# Patient Record
Sex: FEMALE | Race: WHITE | NOT HISPANIC OR LATINO
[De-identification: names, ages, dates, MRNs, and addresses within clinical notes are randomized per-mention and may not be internally consistent; named-entity substitution may affect disease eponyms.]

---

## 2017-04-28 PROBLEM — Z00.00 ENCOUNTER FOR PREVENTIVE HEALTH EXAMINATION: Status: ACTIVE | Noted: 2017-04-28

## 2017-06-29 ENCOUNTER — APPOINTMENT (OUTPATIENT)
Dept: BREAST CENTER | Facility: CLINIC | Age: 52
End: 2017-06-29

## 2017-06-29 VITALS — BODY MASS INDEX: 27.49 KG/M2 | WEIGHT: 165 LBS | HEIGHT: 65 IN

## 2017-06-29 DIAGNOSIS — Z87.891 PERSONAL HISTORY OF NICOTINE DEPENDENCE: ICD-10-CM

## 2017-06-29 DIAGNOSIS — Z91.09 OTHER ALLERGY STATUS, OTHER THAN TO DRUGS AND BIOLOGICAL SUBSTANCES: ICD-10-CM

## 2017-06-29 RX ORDER — CETIRIZINE HYDROCHLORIDE 10 MG/1
10 CAPSULE, LIQUID FILLED ORAL
Refills: 0 | Status: ACTIVE | COMMUNITY

## 2017-06-29 RX ORDER — ALPRAZOLAM 0.5 MG/1
0.5 TABLET ORAL
Qty: 2 | Refills: 0 | Status: DISCONTINUED | COMMUNITY
Start: 2017-05-02 | End: 2017-06-29

## 2017-06-29 RX ORDER — MONTELUKAST SODIUM 10 MG/1
TABLET, FILM COATED ORAL
Refills: 0 | Status: ACTIVE | COMMUNITY

## 2018-01-04 ENCOUNTER — APPOINTMENT (OUTPATIENT)
Dept: BREAST CENTER | Facility: CLINIC | Age: 53
End: 2018-01-04

## 2018-01-11 ENCOUNTER — APPOINTMENT (OUTPATIENT)
Dept: BREAST CENTER | Facility: CLINIC | Age: 53
End: 2018-01-11

## 2018-01-18 ENCOUNTER — APPOINTMENT (OUTPATIENT)
Dept: BREAST CENTER | Facility: CLINIC | Age: 53
End: 2018-01-18

## 2018-06-07 ENCOUNTER — APPOINTMENT (OUTPATIENT)
Dept: BREAST CENTER | Facility: CLINIC | Age: 53
End: 2018-06-07
Payer: COMMERCIAL

## 2018-06-07 VITALS
OXYGEN SATURATION: 94 % | HEIGHT: 65 IN | HEART RATE: 73 BPM | BODY MASS INDEX: 27.49 KG/M2 | DIASTOLIC BLOOD PRESSURE: 64 MMHG | SYSTOLIC BLOOD PRESSURE: 100 MMHG | WEIGHT: 165 LBS

## 2018-06-07 DIAGNOSIS — Z78.9 OTHER SPECIFIED HEALTH STATUS: ICD-10-CM

## 2018-06-07 DIAGNOSIS — Z80.3 FAMILY HISTORY OF MALIGNANT NEOPLASM OF BREAST: ICD-10-CM

## 2018-06-07 DIAGNOSIS — N63.10 UNSPECIFIED LUMP IN THE RIGHT BREAST, UNSPECIFIED QUADRANT: ICD-10-CM

## 2018-06-07 PROCEDURE — 99212 OFFICE O/P EST SF 10 MIN: CPT

## 2018-12-04 ENCOUNTER — APPOINTMENT (OUTPATIENT)
Dept: BREAST CENTER | Facility: CLINIC | Age: 53
End: 2018-12-04

## 2019-01-29 ENCOUNTER — APPOINTMENT (OUTPATIENT)
Dept: BREAST CENTER | Facility: CLINIC | Age: 54
End: 2019-01-29

## 2019-02-07 ENCOUNTER — TRANSCRIPTION ENCOUNTER (OUTPATIENT)
Age: 54
End: 2019-02-07

## 2019-02-15 ENCOUNTER — TRANSCRIPTION ENCOUNTER (OUTPATIENT)
Age: 54
End: 2019-02-15

## 2019-04-18 ENCOUNTER — APPOINTMENT (OUTPATIENT)
Dept: BREAST CENTER | Facility: CLINIC | Age: 54
End: 2019-04-18
Payer: COMMERCIAL

## 2019-04-18 VITALS
TEMPERATURE: 97.6 F | HEIGHT: 65 IN | BODY MASS INDEX: 27.49 KG/M2 | SYSTOLIC BLOOD PRESSURE: 118 MMHG | DIASTOLIC BLOOD PRESSURE: 74 MMHG | WEIGHT: 165 LBS

## 2019-04-18 PROCEDURE — 99212 OFFICE O/P EST SF 10 MIN: CPT

## 2019-11-19 ENCOUNTER — APPOINTMENT (OUTPATIENT)
Dept: BREAST CENTER | Facility: CLINIC | Age: 54
End: 2019-11-19

## 2020-08-05 ENCOUNTER — APPOINTMENT (OUTPATIENT)
Dept: BREAST CENTER | Facility: CLINIC | Age: 55
End: 2020-08-05
Payer: COMMERCIAL

## 2020-08-05 VITALS
BODY MASS INDEX: 27.49 KG/M2 | HEIGHT: 65 IN | SYSTOLIC BLOOD PRESSURE: 112 MMHG | DIASTOLIC BLOOD PRESSURE: 75 MMHG | WEIGHT: 165 LBS | TEMPERATURE: 97.5 F

## 2020-08-05 PROCEDURE — 99213 OFFICE O/P EST LOW 20 MIN: CPT

## 2020-08-05 NOTE — PAST MEDICAL HISTORY
[Menarche Age ____] : age at menarche was [unfilled] [Live Births ___] : P[unfilled]  [Total Preg ___] : G[unfilled] [Age At Live Birth ___] : Age at live birth: [unfilled] [FreeTextEntry6] : none [FreeTextEntry5] : none [FreeTextEntry8] :  first child for 2 years, second for three years, third for 17 months, and fourth for 2 years. [FreeTextEntry7] : OCP use

## 2020-08-05 NOTE — PHYSICAL EXAM
[Atraumatic] : atraumatic [Normocephalic] : normocephalic [No Supraclavicular Adenopathy] : no supraclavicular adenopathy [No Cervical Adenopathy] : no cervical adenopathy [No dominant masses] : no dominant masses in right breast  [No dominant masses] : no dominant masses left breast [No Nipple Discharge] : no left nipple discharge [No Axillary Lymphadenopathy] : no left axillary lymphadenopathy [No Ulceration] : no ulceration [No Rashes] : no rashes [Breast Nipple Inversion] : nipples not inverted [Breast Nipple Retraction] : nipples not retracted

## 2020-08-05 NOTE — DATA REVIEWED
[FreeTextEntry1] : B/L Screening Mammo - 07/22/2020: \par \par Tomosynthesis and 2D imaging of the breast(s) were performed. Current study was also evaluated with a computer aided detection (CAD) system.\par \par Breast density: Almost entirely fatty.\par \par No significant masses, calcifications, or other findings are seen in either breast.\par \par There are biopsy clips in both breasts\par \par IMPRESSION:\par \par There is no mammographic evidence of malignancy.\par \par A 1 year screening mammogram of both breast(s) is recommended. The patient will be sent a normal letter.\par \par BI-RADS 1: NEGATIVE

## 2020-08-05 NOTE — HISTORY OF PRESENT ILLNESS
[FreeTextEntry1] : Patient with left breast benign breast tissue with focal cystic changes and calcifications on Stereo 3/7/13. \par \par No prior complaints related to the breasts. \par Family history of breast cancer - mother 68, maternal aunt x2 30's, paternal grandmother 80's.\par \par Her Geeta Model risk assessment is:\par 2.5 % in five years \par 21.6 % in her lifetime. \par \par Right large cyst and FC changes on ultrasound guided core biopsy 5/10/17; 8-9:00 retroareolar, 8 mm (hourglass).\par \par SUZY RICHARD is a 54 year old female patient who presents today in follow up for high risk screening.\par Since her last visit, she has no new breast related complaints. \par Most recent imaging was done on 07/22/2020, which revealed no mammographic evidence of malignancy.\par \par She presents today for evaluation and imaging review.

## 2020-08-05 NOTE — REVIEW OF SYSTEMS
[As Noted in HPI] : as noted in HPI [Negative] : Constitutional [Breast Pain] : no breast pain [Breast Lump] : no breast lump [Nipple Discharge] : no nipple discharge [Nipple Inverted] : no inversion of the nipple

## 2021-08-03 ENCOUNTER — NON-APPOINTMENT (OUTPATIENT)
Age: 56
End: 2021-08-03

## 2021-08-10 ENCOUNTER — APPOINTMENT (OUTPATIENT)
Dept: BREAST CENTER | Facility: CLINIC | Age: 56
End: 2021-08-10
Payer: COMMERCIAL

## 2021-08-10 VITALS
TEMPERATURE: 97.2 F | SYSTOLIC BLOOD PRESSURE: 116 MMHG | HEIGHT: 65 IN | BODY MASS INDEX: 28.32 KG/M2 | WEIGHT: 170 LBS | DIASTOLIC BLOOD PRESSURE: 72 MMHG

## 2021-08-10 PROCEDURE — 99212 OFFICE O/P EST SF 10 MIN: CPT

## 2021-08-11 NOTE — ASSESSMENT
[FreeTextEntry1] : SUZY is a zoey 55 year old patient who presented today in follow up for high risk screening.  \par She has been doing well with no new breast related complaints. \par Imaging was done recently which revealed no mammographic evidence of malignancy, as detailed above. \par Physical exam was unrevealing today.\par \par PLAN: Imaging with a bilateral breast MRI will be due in January 2022, and that will be scheduled today.  \par She will return for follow-up and clinical breast exam in six months.\par She will be due for b/l mammo in August 2022\par \par I spent a total of 15 minutes of face to face time with this patient, greater than 50% of which was spent in counseling and/or coordination of care.\par All of her questions were appropriately answered.\par She knows to call with any concerns.

## 2021-08-11 NOTE — DATA REVIEWED
[FreeTextEntry1] : Mammogram on 08/02/21:\par Breast density- almost entirely fatty.\par No significant masses, calcifications or other finding are seen in either breast\par There are biopsy clip noted anteriorly in the both breasts\par BIRADS 1 Negative .

## 2021-08-11 NOTE — PAST MEDICAL HISTORY
[Menarche Age ____] : age at menarche was [unfilled] [Total Preg ___] : G[unfilled] [Live Births ___] : P[unfilled]  [Age At Live Birth ___] : Age at live birth: [unfilled] [FreeTextEntry5] : none [FreeTextEntry6] : none [FreeTextEntry7] : OCP use [FreeTextEntry8] :  first child for 2 years, second for three years, third for 17 months, and fourth for 2 years.

## 2021-08-11 NOTE — HISTORY OF PRESENT ILLNESS
[FreeTextEntry1] : Patient with left breast benign breast tissue with focal cystic changes and calcifications on Stereo 3/7/13. \par \par No prior complaints related to the breasts. \par Family history of breast cancer - mother 68, maternal aunt x2 30's, paternal grandmother 80's.\par \par Her Geeta Model risk assessment is:\par 2.5 % in five years \par 21.6 % in her lifetime. \par \par Right large cyst and FC changes on ultrasound guided core biopsy 5/10/17; 8-9:00 retroareolar, 8 mm (hourglass).\par \par SUZY RICHARD is a 55 year old female patient who presents today in follow up for high risk screening.\par Since her last visit, she has no new breast related complaints. \par Most recent imaging was done on 08/02/2021, which revealed no mammographic evidence of malignancy.\par \par She presents today for evaluation and imaging review.

## 2022-02-09 ENCOUNTER — APPOINTMENT (OUTPATIENT)
Dept: BREAST CENTER | Facility: CLINIC | Age: 57
End: 2022-02-09
Payer: COMMERCIAL

## 2022-02-09 VITALS
TEMPERATURE: 98.3 F | WEIGHT: 170 LBS | SYSTOLIC BLOOD PRESSURE: 129 MMHG | HEIGHT: 65 IN | DIASTOLIC BLOOD PRESSURE: 78 MMHG | BODY MASS INDEX: 28.32 KG/M2

## 2022-02-09 DIAGNOSIS — N60.11 DIFFUSE CYSTIC MASTOPATHY OF LEFT BREAST: ICD-10-CM

## 2022-02-09 DIAGNOSIS — Z12.39 ENCOUNTER FOR OTHER SCREENING FOR MALIGNANT NEOPLASM OF BREAST: ICD-10-CM

## 2022-02-09 DIAGNOSIS — N60.12 DIFFUSE CYSTIC MASTOPATHY OF LEFT BREAST: ICD-10-CM

## 2022-02-09 DIAGNOSIS — Z87.2 PERSONAL HISTORY OF DISEASES OF THE SKIN AND SUBCUTANEOUS TISSUE: ICD-10-CM

## 2022-02-09 PROCEDURE — 99212 OFFICE O/P EST SF 10 MIN: CPT

## 2022-02-09 NOTE — DATA REVIEWED
[FreeTextEntry1] : B/L Breast MRI - 02/01/2022: \par \par Amount of fibroglandular tissue: Almost entirely fat.\par  \par Background parenchymal enhancement: Minimal symmetric.\par  \par Findings: Vitamin E tablet has been placed over the skin surface of the lateral right breast at approximately the 8-9 o'clock location. There are susceptibility artifacts in both breasts due to biopsy clips\par  \par RIGHT:\par No suspicious enhancing masses or areas of ductal enhancement are seen in the right breast.\par  \par  \par LEFT:\par No suspicious enhancing masses or areas of ductal enhancement are seen in the left breast.\par  \par  \par AXILLA and ANCILLARY FINDINGS:\par There is no abnormal axillary or internal mammary adenopathy.\par  \par IMPRESSION: \par  \par No MRI evidence to suggest malignancy in either breast\par  \par An MRI exam of both breast is recommended in one year.\par The patient should return for her annual bilateral mammogram in August 2022\par  \par BI-RADS 1: NEGATIVE

## 2022-02-09 NOTE — HISTORY OF PRESENT ILLNESS
[FreeTextEntry1] : Patient with left breast benign breast tissue with focal cystic changes and calcifications on Stereo 3/7/13. \par \par No prior complaints related to the breasts. \par Family history of breast cancer - mother 68, maternal aunt x2 30's, paternal grandmother 80's.\par \par Her Geeta Model risk assessment is:\par 2.5 % in five years \par 21.6 % in her lifetime. \par \par Right large cyst and FC changes on ultrasound guided core biopsy 5/10/17; 8-9:00 retroareolar, 8 mm (hourglass).\par \par \par SUZY RICHARD is a 56 year old female patient who presents today in follow up for high risk screening.\par Over the past 3-4 weeks, she has noticed an area of dimpling in the lateral side of her right breast.\par She denies any palpable lumps, nipple discharge or inversion and / or other skin changes.\par Most recent imaging was a bilateral breast MRI done on 02/01/2022, which revealed no MRI evidence to suggest malignancy in either breast\par  BI-RADS 1: NEGATIVE\par \par She presents today for evaluation and imaging review.

## 2022-02-09 NOTE — REVIEW OF SYSTEMS
[As Noted in HPI] : as noted in HPI [Dimpling Of Skin] : dimpling of breast skin [Negative] : Constitutional [Breast Pain] : no breast pain [Breast Lump] : no breast lump [Nipple Discharge] : no nipple discharge [Nipple Inverted] : no inversion of the nipple

## 2022-02-09 NOTE — PHYSICAL EXAM
[Normocephalic] : normocephalic [Atraumatic] : atraumatic [No Supraclavicular Adenopathy] : no supraclavicular adenopathy [No dominant masses] : no dominant masses in right breast  [No dominant masses] : no dominant masses left breast [No Nipple Discharge] : no left nipple discharge [No Rashes] : no rashes [No Ulceration] : no ulceration [Breast Nipple Inversion] : nipples not inverted [Breast Nipple Retraction] : nipples not retracted [de-identified] : area of slight skin dimpling noted on the lateral aspect of the right breast. [de-identified] : No axillary lymphadenopathy appreciated. [de-identified] : No axillary lymphadenopathy appreciated.

## 2022-02-09 NOTE — ASSESSMENT
[FreeTextEntry1] : SUZY is a zoey 56 year old patient who presented today in follow up for high risk screening.  \par Over the past 3-4 weeks, she has noticed an area of dimpling in the lateral side of her right breast.\par She denies any palpable lumps, nipple discharge or inversion and / or other skin changes.\par Most recent imaging was a bilateral breast MRI done on 02/01/2022, which revealed no MRI evidence to suggest malignancy in either breast\par  BI-RADS 1: NEGATIVE, as detailed above. \par \par Physical exam today shows area of slight skin dimpling noted on the lateral aspect of the right breast.\par \par Although recent MRI imaging was negative, due to new onset finding of right breast skin dimpling - I will order a right breast diagnostic mammogram and sonogram for further evaluation.\par We will discuss these results when they become available.\par If benign, she will have a bilateral screening mammogram in August 2022\par She will return for follow-up and clinical breast exam in six months.\par \par The patient was informed that Dr. José Milner will no longer be practicing here as of the end of August 2021; her care will be continued with the practice.\par \par I spent a total of 15 minutes of face to face time with this patient, greater than 50% of which was spent in counseling and/or coordination of care.\par All of her questions were appropriately answered.\par She knows to call with any concerns.

## 2023-07-26 NOTE — HISTORY OF PRESENT ILLNESS
[FreeTextEntry1] : Patient is a 57 year old female here today for a follow up visit. \par 2/16/2023 Right mammogram: No significant masses, calcifications, or other findings are seen in the right breast. Biopsy clip is again noted in the anterior 8-9 o'clock right breast. \par Right ultrasound: No suspicious abnormalities were seen sonographically in the right breast. \par The lateral area of concern at approximately 8:00, 9 cm from the nipple was unremarkable. Scanning of this area was performed by the technologist as well as the dictating radiologist. There Is a 0.3 cm subtle hypoechoic lesion with a biopsy clip in the 8-9 o'clock retroareolar right breast, biopsy-proven benign in May 2017.

## 2023-07-31 ENCOUNTER — APPOINTMENT (OUTPATIENT)
Dept: PLASTIC SURGERY | Facility: CLINIC | Age: 58
End: 2023-07-31

## 2023-09-08 ENCOUNTER — NON-APPOINTMENT (OUTPATIENT)
Age: 58
End: 2023-09-08

## 2023-09-08 DIAGNOSIS — M25.529 PAIN IN UNSPECIFIED ELBOW: ICD-10-CM

## 2023-09-08 DIAGNOSIS — Z82.0 FAMILY HISTORY OF EPILEPSY AND OTHER DISEASES OF THE NERVOUS SYSTEM: ICD-10-CM

## 2023-09-08 DIAGNOSIS — Z78.9 OTHER SPECIFIED HEALTH STATUS: ICD-10-CM

## 2023-09-08 DIAGNOSIS — G43.909 MIGRAINE, UNSPECIFIED, NOT INTRACTABLE, W/OUT STATUS MIGRAINOSUS: ICD-10-CM

## 2024-02-25 ENCOUNTER — RESULT REVIEW (OUTPATIENT)
Age: 59
End: 2024-02-25

## 2024-02-25 ENCOUNTER — EMERGENCY (EMERGENCY)
Facility: HOSPITAL | Age: 59
LOS: 0 days | Discharge: HOME | End: 2024-02-25
Attending: EMERGENCY MEDICINE
Payer: COMMERCIAL

## 2024-02-25 ENCOUNTER — TRANSCRIPTION ENCOUNTER (OUTPATIENT)
Age: 59
End: 2024-02-25

## 2024-02-25 VITALS
HEART RATE: 72 BPM | OXYGEN SATURATION: 98 % | DIASTOLIC BLOOD PRESSURE: 60 MMHG | TEMPERATURE: 98 F | RESPIRATION RATE: 18 BRPM | SYSTOLIC BLOOD PRESSURE: 119 MMHG

## 2024-02-25 VITALS
WEIGHT: 169.98 LBS | SYSTOLIC BLOOD PRESSURE: 130 MMHG | HEART RATE: 105 BPM | DIASTOLIC BLOOD PRESSURE: 80 MMHG | OXYGEN SATURATION: 99 % | TEMPERATURE: 98 F | RESPIRATION RATE: 18 BRPM

## 2024-02-25 DIAGNOSIS — S09.90XA UNSPECIFIED INJURY OF HEAD, INITIAL ENCOUNTER: ICD-10-CM
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
## 2024-02-25 DIAGNOSIS — R12 HEARTBURN: ICD-10-CM

## 2024-02-25 DIAGNOSIS — R07.89 OTHER CHEST PAIN: ICD-10-CM

## 2024-02-25 DIAGNOSIS — M54.9 DORSALGIA, UNSPECIFIED: ICD-10-CM

## 2024-02-25 DIAGNOSIS — Z78.9 OTHER SPECIFIED HEALTH STATUS: Chronic | ICD-10-CM

## 2024-02-25 DIAGNOSIS — R55 SYNCOPE AND COLLAPSE: ICD-10-CM

## 2024-02-25 DIAGNOSIS — Z87.891 PERSONAL HISTORY OF NICOTINE DEPENDENCE: ICD-10-CM
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
## 2024-02-25 LAB
ALBUMIN SERPL ELPH-MCNC: 4.9 G/DL — SIGNIFICANT CHANGE UP (ref 3.5–5.2)
ALP SERPL-CCNC: 103 U/L — SIGNIFICANT CHANGE UP (ref 30–115)
ALT FLD-CCNC: 19 U/L — SIGNIFICANT CHANGE UP (ref 0–41)
ANION GAP SERPL CALC-SCNC: 15 MMOL/L — HIGH (ref 7–14)
AST SERPL-CCNC: 22 U/L — SIGNIFICANT CHANGE UP (ref 0–41)
BASOPHILS # BLD AUTO: 0.1 K/UL — SIGNIFICANT CHANGE UP (ref 0–0.2)
BASOPHILS NFR BLD AUTO: 1.2 % — HIGH (ref 0–1)
BILIRUB SERPL-MCNC: <0.2 MG/DL — SIGNIFICANT CHANGE UP (ref 0.2–1.2)
BUN SERPL-MCNC: 19 MG/DL — SIGNIFICANT CHANGE UP (ref 10–20)
CALCIUM SERPL-MCNC: 9.4 MG/DL — SIGNIFICANT CHANGE UP (ref 8.4–10.5)
CHLORIDE SERPL-SCNC: 105 MMOL/L — SIGNIFICANT CHANGE UP (ref 98–110)
CO2 SERPL-SCNC: 20 MMOL/L — SIGNIFICANT CHANGE UP (ref 17–32)
CREAT SERPL-MCNC: 0.7 MG/DL — SIGNIFICANT CHANGE UP (ref 0.7–1.5)
EGFR: 101 ML/MIN/1.73M2 — SIGNIFICANT CHANGE UP
EOSINOPHIL # BLD AUTO: 0.3 K/UL — SIGNIFICANT CHANGE UP (ref 0–0.7)
EOSINOPHIL NFR BLD AUTO: 3.6 % — SIGNIFICANT CHANGE UP (ref 0–8)
GLUCOSE SERPL-MCNC: 158 MG/DL — HIGH (ref 70–99)
HCT VFR BLD CALC: 42.4 % — SIGNIFICANT CHANGE UP (ref 37–47)
HGB BLD-MCNC: 13.9 G/DL — SIGNIFICANT CHANGE UP (ref 12–16)
IMM GRANULOCYTES NFR BLD AUTO: 0.4 % — HIGH (ref 0.1–0.3)
LACTATE SERPL-SCNC: 2.7 MMOL/L — HIGH (ref 0.7–2)
LIDOCAIN IGE QN: 44 U/L — SIGNIFICANT CHANGE UP (ref 7–60)
LYMPHOCYTES # BLD AUTO: 1.55 K/UL — SIGNIFICANT CHANGE UP (ref 1.2–3.4)
LYMPHOCYTES # BLD AUTO: 18.6 % — LOW (ref 20.5–51.1)
MCHC RBC-ENTMCNC: 27.7 PG — SIGNIFICANT CHANGE UP (ref 27–31)
MCHC RBC-ENTMCNC: 32.8 G/DL — SIGNIFICANT CHANGE UP (ref 32–37)
MCV RBC AUTO: 84.5 FL — SIGNIFICANT CHANGE UP (ref 81–99)
MONOCYTES # BLD AUTO: 0.52 K/UL — SIGNIFICANT CHANGE UP (ref 0.1–0.6)
MONOCYTES NFR BLD AUTO: 6.2 % — SIGNIFICANT CHANGE UP (ref 1.7–9.3)
NEUTROPHILS # BLD AUTO: 5.85 K/UL — SIGNIFICANT CHANGE UP (ref 1.4–6.5)
NEUTROPHILS NFR BLD AUTO: 70 % — SIGNIFICANT CHANGE UP (ref 42.2–75.2)
NRBC # BLD: 0 /100 WBCS — SIGNIFICANT CHANGE UP (ref 0–0)
PLATELET # BLD AUTO: 299 K/UL — SIGNIFICANT CHANGE UP (ref 130–400)
PMV BLD: 9.1 FL — SIGNIFICANT CHANGE UP (ref 7.4–10.4)
POTASSIUM SERPL-MCNC: 3.9 MMOL/L — SIGNIFICANT CHANGE UP (ref 3.5–5)
POTASSIUM SERPL-SCNC: 3.9 MMOL/L — SIGNIFICANT CHANGE UP (ref 3.5–5)
PROT SERPL-MCNC: 7.7 G/DL — SIGNIFICANT CHANGE UP (ref 6–8)
RBC # BLD: 5.02 M/UL — SIGNIFICANT CHANGE UP (ref 4.2–5.4)
RBC # FLD: 13.2 % — SIGNIFICANT CHANGE UP (ref 11.5–14.5)
SODIUM SERPL-SCNC: 140 MMOL/L — SIGNIFICANT CHANGE UP (ref 135–146)
TROPONIN T, HIGH SENSITIVITY RESULT: 8 NG/L — SIGNIFICANT CHANGE UP (ref 6–13)
TROPONIN T, HIGH SENSITIVITY RESULT: 9 NG/L — SIGNIFICANT CHANGE UP (ref 6–13)
WBC # BLD: 8.35 K/UL — SIGNIFICANT CHANGE UP (ref 4.8–10.8)
WBC # FLD AUTO: 8.35 K/UL — SIGNIFICANT CHANGE UP (ref 4.8–10.8)

## 2024-02-25 PROCEDURE — 70450 CT HEAD/BRAIN W/O DYE: CPT | Mod: MC

## 2024-02-25 PROCEDURE — 84484 ASSAY OF TROPONIN QUANT: CPT

## 2024-02-25 PROCEDURE — 99223 1ST HOSP IP/OBS HIGH 75: CPT

## 2024-02-25 PROCEDURE — 93306 TTE W/DOPPLER COMPLETE: CPT

## 2024-02-25 PROCEDURE — 70450 CT HEAD/BRAIN W/O DYE: CPT | Mod: 26,MC

## 2024-02-25 PROCEDURE — 93306 TTE W/DOPPLER COMPLETE: CPT | Mod: 26

## 2024-02-25 PROCEDURE — 93017 CV STRESS TEST TRACING ONLY: CPT

## 2024-02-25 PROCEDURE — 82962 GLUCOSE BLOOD TEST: CPT

## 2024-02-25 PROCEDURE — 93005 ELECTROCARDIOGRAM TRACING: CPT

## 2024-02-25 PROCEDURE — 78452 HT MUSCLE IMAGE SPECT MULT: CPT | Mod: 26,MC

## 2024-02-25 PROCEDURE — 93016 CV STRESS TEST SUPVJ ONLY: CPT

## 2024-02-25 PROCEDURE — 93010 ELECTROCARDIOGRAM REPORT: CPT

## 2024-02-25 PROCEDURE — 96374 THER/PROPH/DIAG INJ IV PUSH: CPT | Mod: XU

## 2024-02-25 PROCEDURE — 72125 CT NECK SPINE W/O DYE: CPT | Mod: 26,MC

## 2024-02-25 PROCEDURE — 83690 ASSAY OF LIPASE: CPT

## 2024-02-25 PROCEDURE — 83605 ASSAY OF LACTIC ACID: CPT

## 2024-02-25 PROCEDURE — 99285 EMERGENCY DEPT VISIT HI MDM: CPT | Mod: 25

## 2024-02-25 PROCEDURE — 71046 X-RAY EXAM CHEST 2 VIEWS: CPT

## 2024-02-25 PROCEDURE — 80053 COMPREHEN METABOLIC PANEL: CPT

## 2024-02-25 PROCEDURE — 96375 TX/PRO/DX INJ NEW DRUG ADDON: CPT | Mod: XU

## 2024-02-25 PROCEDURE — 85025 COMPLETE CBC W/AUTO DIFF WBC: CPT

## 2024-02-25 PROCEDURE — 72170 X-RAY EXAM OF PELVIS: CPT | Mod: 26

## 2024-02-25 PROCEDURE — A9500: CPT

## 2024-02-25 PROCEDURE — 71046 X-RAY EXAM CHEST 2 VIEWS: CPT | Mod: 26

## 2024-02-25 PROCEDURE — 93018 CV STRESS TEST I&R ONLY: CPT

## 2024-02-25 PROCEDURE — 72125 CT NECK SPINE W/O DYE: CPT | Mod: MC

## 2024-02-25 PROCEDURE — G0378: CPT

## 2024-02-25 PROCEDURE — 72170 X-RAY EXAM OF PELVIS: CPT

## 2024-02-25 PROCEDURE — 78452 HT MUSCLE IMAGE SPECT MULT: CPT | Mod: MC

## 2024-02-25 PROCEDURE — 36415 COLL VENOUS BLD VENIPUNCTURE: CPT

## 2024-02-25 RX ORDER — REGADENOSON 0.08 MG/ML
0.4 INJECTION, SOLUTION INTRAVENOUS ONCE
Refills: 0 | Status: COMPLETED | OUTPATIENT
Start: 2024-02-25 | End: 2024-02-25

## 2024-02-25 RX ORDER — FAMOTIDINE 10 MG/ML
20 INJECTION INTRAVENOUS ONCE
Refills: 0 | Status: COMPLETED | OUTPATIENT
Start: 2024-02-25 | End: 2024-02-25

## 2024-02-25 RX ORDER — SODIUM CHLORIDE 9 MG/ML
1000 INJECTION, SOLUTION INTRAVENOUS ONCE
Refills: 0 | Status: COMPLETED | OUTPATIENT
Start: 2024-02-25 | End: 2024-02-25

## 2024-02-25 RX ADMIN — FAMOTIDINE 20 MILLIGRAM(S): 10 INJECTION INTRAVENOUS at 08:29

## 2024-02-25 RX ADMIN — Medication 30 MILLILITER(S): at 08:28

## 2024-02-25 RX ADMIN — REGADENOSON 0.4 MILLIGRAM(S): 0.08 INJECTION, SOLUTION INTRAVENOUS at 13:42

## 2024-02-25 RX ADMIN — SODIUM CHLORIDE 1000 MILLILITER(S): 9 INJECTION, SOLUTION INTRAVENOUS at 06:58

## 2024-02-25 NOTE — ED PROVIDER NOTE - ATTENDING CONTRIBUTION TO CARE
Patient presents to ED, s/p syncope and fall. No seizure like activity noted. No tongue bite, no incontinence. Patient was having hurt burn prior to syncope.   Vitals reviewed.   Lungs: CTA, no wheezing, no crackles.  Abd: +BS, NT, ND, soft,   CNS: awake, alert, o x 3, no focal neurologic deficits.  A/P: Syncope/fall,   heart burn,   labs, EKG, imaging,   reevaluation.

## 2024-02-25 NOTE — ED CDU PROVIDER INITIAL DAY NOTE - PROGRESS NOTE DETAILS
patient comfortable, no complaint, patient informed of nuclear stress testing will follow up with her gastroenterology and primary care, case d/w dr. coreas

## 2024-02-25 NOTE — ED PROVIDER NOTE - NSICDXFAMILYHX_GEN_ALL_CORE_FT
FAMILY HISTORY:  Father  Still living? Yes, Estimated age: Age Unknown  FH: HTN (hypertension), Age at diagnosis: Age Unknown    Mother  Still living? Yes, Estimated age: Age Unknown  FH: HTN (hypertension), Age at diagnosis: Age Unknown

## 2024-02-25 NOTE — ED CDU PROVIDER INITIAL DAY NOTE - OBJECTIVE STATEMENT
: 58-year-old female 58-year-old female past medical history of chronic back pain, treated with daily Advil for many years, presenting status post syncope about  2 hours prior to arrival.  Patient reports she had a party this evening where she a a lot of junk food, drink alcohol, drink coffee and was awake much of the night with "heartburn ".  At about 4:30 AM she got out of bed to get Tums from the bathroom when she felt suddenly flushed, and then woke up on the floor.  Her family found her immediately afterwards, not confused, immediately back to baseline.  Patient reports she hit the back of her head and her tailbone when she fell and now complains of local pain to her head and her tailbone, and persistent "heartburn ".  She denies any prodrome of palpitations, shortness of breath, dizziness also denies any current dizziness, vision changes, weakness, numbness, tingling, incontinence, vomiting, diarrhea, abdominal pain

## 2024-02-25 NOTE — ED CDU PROVIDER INITIAL DAY NOTE - ATTENDING APP SHARED VISIT CONTRIBUTION OF CARE
Pt has a history of reflux. Woke up 4 am with heart burn and went to the bathroom to get TUMS. There after became lightheaded and passed out. Woke up not confused. Hit head and reported bump to the left back of head. No chest pain. Also hit lower back. Now is feeling well. No hx of HTN, Diabetes, elevated chol, or family hx of CAD. Pt takes Advil for lower back pain. zyrtec, and Pepcid. Still with heart burn.

## 2024-02-25 NOTE — ED CDU PROVIDER INITIAL DAY NOTE - CLINICAL SUMMARY MEDICAL DECISION MAKING FREE TEXT BOX
Pt placed into observation for syncope. Stress and echo ordered. Pt placed into observation for syncope. Stress and echo ordered. Stress neg. Echo essentially normal. Tricuspid regurg mild

## 2024-02-25 NOTE — ED CDU PROVIDER DISPOSITION NOTE - CLINICAL COURSE
Pt presents with syncope and chest pain. CE neg. CT scan for trauma neg. Chest x-ray neg. Stress neg. Advised follow up with pmd. Pt presents with syncope and chest pain. CE neg. CT scan for trauma neg. Chest x-ray neg. Stress neg. Advised follow up with pmd. Echo LV normal. Mild TR.

## 2024-02-25 NOTE — ED CDU PROVIDER INITIAL DAY NOTE - CROS ED PSYCH ALL NEG
Consent (Near Eyelid Margin)/Introductory Paragraph: The rationale for Mohs was explained to the patient and consent was obtained. The risks, benefits and alternatives to therapy were discussed in detail. Specifically, the risks of ectropion or eyelid deformity, infection, scarring, bleeding, prolonged wound healing, incomplete removal, allergy to anesthesia, nerve injury and recurrence were addressed. Prior to the procedure, the treatment site was clearly identified and confirmed by the patient. All components of Universal Protocol/PAUSE Rule completed. negative...

## 2024-02-25 NOTE — ED PROVIDER NOTE - IV ALTEPLASE ADMIN OUTSIDE HIDDEN
show Cephalexin Counseling: I counseled the patient regarding use of cephalexin as an antibiotic for prophylactic and/or therapeutic purposes. Cephalexin (commonly prescribed under brand name Keflex) is a cephalosporin antibiotic which is active against numerous classes of bacteria, including most skin bacteria. Side effects may include nausea, diarrhea, gastrointestinal upset, rash, hives, yeast infections, and in rare cases, hepatitis, kidney disease, seizures, fever, confusion, neurologic symptoms, and others. Patients with severe allergies to penicillin medications are cautioned that there is about a 10% incidence of cross-reactivity with cephalosporins. When possible, patients with penicillin allergies should use alternatives to cephalosporins for antibiotic therapy.

## 2024-02-25 NOTE — ED CDU PROVIDER INITIAL DAY NOTE - HEME LYMPH, MLM
Received request via: Pharmacy    Was the patient seen in the last year in this department? Yes    Does the patient have an active prescription (recently filled or refills available) for medication(s) requested? No   No adenopathy or splenomegaly. No cervical or inguinal lymphadenopathy.

## 2024-02-25 NOTE — ED ADULT NURSE NOTE - OBJECTIVE STATEMENT
58 yr old female complaining of syncope in the bathroom. Pt does not remember hitting her head but woke up on the floor with a head bump and tailbone pain

## 2024-02-25 NOTE — ED PROVIDER NOTE - CLINICAL SUMMARY MEDICAL DECISION MAKING FREE TEXT BOX
TUSHAR Nicole–patient signed out to me by Dr. Russo.  Patient presented with woke up with heartburn while walking to bathroom to get Tums passed out hit head and tailbone.  CT head prelim negative.  EKG normal sinus, troponin 8 and 9.  Patient signed out to Dr. Jones observation attending, to go to observation unit for syncope pathway and chest pain pathway to obtain echo telemetry monitor and provocative testing.  Patient agreeable.  Signed out    ED EKG: my independent interpretation - Dr. My Nicole : [95 NSR, no STEMI]  ED CXR prelim, my independent interpretation - Dr. My Nicole: [No PTX, No infiltrates, No Free air] TUSHAR Nicole–patient signed out to me by Dr. Russo.  Patient presented with woke up with heartburn while walking to bathroom to get Tums passed out hit head and tailbone.  CT head prelim negative.  EKG normal sinus, troponin 8 and 9.  Patient signed out to Dr. Jones observation attending, to go to observation unit for syncope pathway and chest pain pathway to obtain echo telemetry monitor and provocative testing.  Patient agreeable.  Signed out    ED EKG: my independent interpretation - Dr. My Nicole : [95 NSR, no STEMI]  ED CXR prelim, my independent interpretation - Dr. My Nicole: [No PTX, No infiltrates, No Free air]    X-rays reviewed by me Dr. Russo and shows no Fractures, no dislocation and no FB noted.

## 2024-02-25 NOTE — ED CDU PROVIDER DISPOSITION NOTE - PATIENT PORTAL LINK FT
You can access the FollowMyHealth Patient Portal offered by [facility] by registering at the following website: [email or web address]. By joining [facility]’s FollowMyHealth portal, you will also be able to view your health information using other applications (apps) compatible with our system.

## 2024-02-25 NOTE — ED CDU PROVIDER DISPOSITION NOTE - CARE PROVIDER_API CALL
Elfego Morales  Internal Medicine  [address] [place] [address]  Phone: [phone]  Fax: [phone]  Follow Up Time: 4-6 Days    Elfego Geroges  Interventional Cardiology  [address] [place] [address], [facility] [address]  [place] [address]  Phone: [phone]  Fax: [phone]  Follow Up Time: 4-6 Days

## 2024-02-25 NOTE — ED ADULT TRIAGE NOTE - CHIEF COMPLAINT QUOTE
pt states that she syncopised in the bathroom from standing, states she feels pain to the back of her head

## 2024-02-25 NOTE — ED CDU PROVIDER DISPOSITION NOTE - NSFOLLOWUPINSTRUCTIONS_ED_ALL_ED_FT
return if symptoms persist or gets worse  follow up with cardiologist primary care and gastroenterologists

## 2024-02-25 NOTE — ED CDU PROVIDER DISPOSITION NOTE - PROVIDER TOKENS
No
PROVIDER:[TOKEN:[[id number]],FOLLOWUP:[4-6 Days]],PROVIDER:[TOKEN:[[id number]],FOLLOWUP:[4-6 Days]]

## 2024-02-25 NOTE — ED CDU PROVIDER INITIAL DAY NOTE - CPE EDP ENMT NORM
Patient is awake and in chair.  Acceptable level of pain.  Call bell in reach. All needs addressed at this time.  Will continue to monitor.   normal...

## 2024-02-25 NOTE — ED PROVIDER NOTE - OBJECTIVE STATEMENT
58-year-old female 58-year-old female past medical history of chronic back pain, treated with daily Advil for many years, presenting status post syncope about  2 hours prior to arrival.  Patient reports she had a party this evening where she a a lot of junk food, drink alcohol, drink coffee and was awake much of the night with "heartburn ".  At about 4:30 AM she got out of bed to get Tums from the bathroom when she felt suddenly flushed, and then woke up on the floor.  Her family found her immediately afterwards, not confused, immediately back to baseline.  Patient reports she hit the back of her head and her tailbone when she fell and now complains of local pain to her head and her tailbone, and persistent "heartburn ".  She denies any prodrome of palpitations, shortness of breath, dizziness also denies any current dizziness, vision changes, weakness, numbness, tingling, incontinence, vomiting, diarrhea, abdominal pain

## 2024-02-25 NOTE — ED PROVIDER NOTE - PROGRESS NOTE DETAILS
SS Patient s/o from  Over. CP did improve slightly. Patient aware and agreeable to obs plan for syncope and cp. Obs team aware

## 2024-02-25 NOTE — ED ADULT NURSE NOTE - NSFALLUNIVINTERV_ED_ALL_ED
Bed/Stretcher in lowest position, wheels locked, appropriate side rails in place/Call bell, personal items and telephone in reach/Instruct patient to call for assistance before getting out of bed/chair/stretcher/Non-slip footwear applied when patient is off stretcher/[place] to call system/Physically safe environment - no spills, clutter or unnecessary equipment/Purposeful proactive rounding/Room/bathroom lighting operational, light cord in reach

## 2024-02-26 PROBLEM — K21.9 GASTRO-ESOPHAGEAL REFLUX DISEASE WITHOUT ESOPHAGITIS: Chronic | Status: ACTIVE | Noted: 2024-02-25

## 2024-03-18 ENCOUNTER — APPOINTMENT (OUTPATIENT)
Dept: CARDIOLOGY | Facility: CLINIC | Age: 59
End: 2024-03-18

## 2024-06-18 ENCOUNTER — NON-APPOINTMENT (OUTPATIENT)
Age: 59
End: 2024-06-18

## 2024-06-19 ENCOUNTER — APPOINTMENT (OUTPATIENT)
Dept: ORTHOPEDIC SURGERY | Facility: CLINIC | Age: 59
End: 2024-06-19
Payer: COMMERCIAL

## 2024-06-19 DIAGNOSIS — S52.121A DISPLACED FRACTURE OF HEAD OF RIGHT RADIUS, INITIAL ENCOUNTER FOR CLOSED FRACTURE: ICD-10-CM

## 2024-06-19 DIAGNOSIS — S80.02XA CONTUSION OF LEFT KNEE, INITIAL ENCOUNTER: ICD-10-CM

## 2024-06-19 PROCEDURE — 73562 X-RAY EXAM OF KNEE 3: CPT | Mod: LT

## 2024-06-19 PROCEDURE — 99203 OFFICE O/P NEW LOW 30 MIN: CPT | Mod: 25

## 2024-06-19 PROCEDURE — 73080 X-RAY EXAM OF ELBOW: CPT | Mod: RT

## 2024-06-19 NOTE — IMAGING
[de-identified] : On examination of the right elbow, Patient has generalized swelling, no ecchymosis noted.  Patient has decreased range of motion to extension, she is comfortable when she is flex to about 90 degrees. Patient has pain with pronation and supination. Pain over the radial head. Neurovascular intact.  Examination of the left knee, patient has tender when palpating over the Anterior aspect. Negative Pablo's. Antalgic gait No signs of instability. Neurovascular intact.  Radiographs taken to the right elbow, 3 views, show a minimally displaced radial head fracture.  Radiographs taken of the left knee, 3 views were taken, negative for any acute fracture or dislocation.  Mild joint narrowing space of the medial compartment.

## 2024-06-19 NOTE — HISTORY OF PRESENT ILLNESS
[de-identified] : 58-year-old female here for an evaluation of injury sustained to the right elbow and left knee. Patient states that this injury happened on June 18, 2024, she states that she was walking, and she tripped with her friend that was near her and fell down injuring her right elbow and left knee. Patient states that the pain on her right elbow is extreme, starts at the elbow and radiates to the forearm. Patient is states that the pain is 10/10 when attempting to move the arm. The pain at rest is 6/10. Patient also has pain on the anterior aspect of the left knee.

## 2024-06-19 NOTE — DISCUSSION/SUMMARY
[de-identified] : Impression: Minimally displaced fracture over the radial head of the right elbow. Contusion to the left knee.  Plan: Patient was advised for Is an issue.  The elbow for support and compression, the use of the sling and to avoid any weightbearing to the right upper extremity.  Patient was advised for over-the-counter anti-inflammatories for pain. She was advised for ice, resting and gentle range of motion as tolerated to the elbow to prevent stiffness  Follow-up:2 weeks for repeat evaluation with our hand PAs.

## 2024-07-01 ENCOUNTER — APPOINTMENT (OUTPATIENT)
Dept: ORTHOPEDIC SURGERY | Facility: CLINIC | Age: 59
End: 2024-07-01

## 2024-07-17 ENCOUNTER — NON-APPOINTMENT (OUTPATIENT)
Age: 59
End: 2024-07-17

## 2024-07-17 DIAGNOSIS — R92.8 OTHER ABNORMAL AND INCONCLUSIVE FINDINGS ON DIAGNOSTIC IMAGING OF BREAST: ICD-10-CM

## 2024-09-27 ENCOUNTER — APPOINTMENT (OUTPATIENT)
Dept: BREAST CENTER | Facility: CLINIC | Age: 59
End: 2024-09-27
Payer: COMMERCIAL

## 2024-09-27 DIAGNOSIS — N60.12 DIFFUSE CYSTIC MASTOPATHY OF LEFT BREAST: ICD-10-CM

## 2024-09-27 DIAGNOSIS — Z12.39 ENCOUNTER FOR OTHER SCREENING FOR MALIGNANT NEOPLASM OF BREAST: ICD-10-CM

## 2024-09-27 DIAGNOSIS — Z80.3 FAMILY HISTORY OF MALIGNANT NEOPLASM OF BREAST: ICD-10-CM

## 2024-09-27 DIAGNOSIS — R92.8 OTHER ABNORMAL AND INCONCLUSIVE FINDINGS ON DIAGNOSTIC IMAGING OF BREAST: ICD-10-CM

## 2024-09-27 DIAGNOSIS — N60.11 DIFFUSE CYSTIC MASTOPATHY OF LEFT BREAST: ICD-10-CM

## 2024-09-27 PROCEDURE — 99213 OFFICE O/P EST LOW 20 MIN: CPT

## 2024-09-27 NOTE — PHYSICAL EXAM
[Normocephalic] : normocephalic [Atraumatic] : atraumatic [No Supraclavicular Adenopathy] : no supraclavicular adenopathy [No dominant masses] : no dominant masses in right breast  [No dominant masses] : no dominant masses left breast [No Nipple Discharge] : no left nipple discharge [No Rashes] : no rashes [No Ulceration] : no ulceration [Breast Nipple Inversion] : nipples not inverted [Breast Nipple Retraction] : nipples not retracted [de-identified] : B/L nonspecific nodularity.  [de-identified] : No axillary lymphadenopathy appreciated. [de-identified] : No axillary lymphadenopathy appreciated.

## 2024-09-27 NOTE — HISTORY OF PRESENT ILLNESS
[FreeTextEntry1] : Patient with left breast benign breast tissue with focal cystic changes and calcifications on Stereo 3/7/13.   No prior complaints related to the breasts.  Family history of breast cancer - mother 68, maternal aunt x2 30's, paternal grandmother 80's.  Her Geeta Model risk assessment is: 2.5 % in five years  21.6 % in her lifetime.   Right large cyst and FC changes on ultrasound guided core biopsy 5/10/17; 8-9:00 retroareolar, 8 mm (hourglass).   SUZY RICHARD is a 58-year-old female patient who presents today in follow up for high-risk screening. She has no breast related complaints at this time.  She denies any breast pain, has not palpated any new masses in either breast and denies any nipple discharge or retraction.  Most recent imaging: B/L Screening Mammo - 12/18/2023: -Breast density: Almost entirely fatty. -No suspicious masses, calcifications, or other findings are seen. -No imaging evidence of malignancy on the current exam(s). BI-RADS 1: NEGATIVE  B/L Breast MRI - 07/17/2024: -Amount of fibroglandular tissue: Almost entirely fat. RIGHT: -There is a subtle 0.9 cm area of linear non mass enhancement noted superficially in the anterior 7:00 right breast, best seen on series 7, image #129. Further assessment with MRI guided core biopsy is recommended LEFT: -No suspicious enhancing masses or areas of ductal enhancement are seen in the left breast. AXILLA and ANCILLARY FINDINGS: -There is no axillary or internal mammary adenopathy. BI-RADS 4: SUSPICIOUS  Pt went for MRI Guided Core Bx on 08/19/2024: IMPRESSION:  MRI guided biopsy was not performed since the previously seen asymmetry was not enhancing.  Follow-up MRI in 6 months is recommended.    She presents today for evaluation and imaging review.

## 2024-09-27 NOTE — ASSESSMENT
[FreeTextEntry1] : SUZY is a zoey 58-year-old patient who presented today in follow up for high-risk screening.   She has no breast related complaints at this time.  She denies any breast pain, has not palpated any new masses in either breast and denies any nipple discharge or retraction.  Most recent imaging: B/L Screening Mammo - 12/18/2023: -Breast density: Almost entirely fatty. -No suspicious masses, calcifications, or other findings are seen. -No imaging evidence of malignancy on the current exam(s). BI-RADS 1: NEGATIVE  B/L Breast MRI - 07/17/2024: -Amount of fibroglandular tissue: Almost entirely fat. RIGHT: -There is a subtle 0.9 cm area of linear non mass enhancement noted superficially in the anterior 7:00 right breast, best seen on series 7, image #129. Further assessment with MRI guided core biopsy is recommended LEFT: -No suspicious enhancing masses or areas of ductal enhancement are seen in the left breast. AXILLA and ANCILLARY FINDINGS: -There is no axillary or internal mammary adenopathy. BI-RADS 4: SUSPICIOUS  Pt went for MRI Guided Core Bx on 08/19/2024: IMPRESSION:  MRI guided biopsy was not performed since the previously seen asymmetry was not enhancing.  Follow-up MRI in 6 months is recommended., as detailed above.   Physical exam today shows B/L nonspecific nodularity.   Imaging with a bilateral screening mammogram will be due in December 2024, and that will be ordered today. We will plan to discuss those results via telephone. If benign, she will have repeat B/L Breast MRI in February 2025 and return for clinical breast exam following.  I spent a total of 20 minutes of face-to-face time with this patient, greater than 50% of which was spent in counseling and/or coordination of care. All of her questions were appropriately answered. She knows to call with any concerns.

## 2025-02-20 ENCOUNTER — NON-APPOINTMENT (OUTPATIENT)
Age: 60
End: 2025-02-20

## 2025-09-08 ENCOUNTER — NON-APPOINTMENT (OUTPATIENT)
Age: 60
End: 2025-09-08